# Patient Record
Sex: FEMALE | Race: BLACK OR AFRICAN AMERICAN | Employment: UNEMPLOYED | URBAN - METROPOLITAN AREA
[De-identification: names, ages, dates, MRNs, and addresses within clinical notes are randomized per-mention and may not be internally consistent; named-entity substitution may affect disease eponyms.]

---

## 2019-01-20 ENCOUNTER — APPOINTMENT (OUTPATIENT)
Dept: GENERAL RADIOLOGY | Age: 29
End: 2019-01-20
Attending: EMERGENCY MEDICINE
Payer: MEDICAID

## 2019-01-20 ENCOUNTER — HOSPITAL ENCOUNTER (EMERGENCY)
Age: 29
Discharge: HOME | End: 2019-01-20
Attending: EMERGENCY MEDICINE | Admitting: EMERGENCY MEDICINE
Payer: MEDICAID

## 2019-01-20 VITALS
TEMPERATURE: 98 F | SYSTOLIC BLOOD PRESSURE: 105 MMHG | DIASTOLIC BLOOD PRESSURE: 62 MMHG | WEIGHT: 109 LBS | BODY MASS INDEX: 20.06 KG/M2 | RESPIRATION RATE: 22 BRPM | OXYGEN SATURATION: 100 % | HEIGHT: 62 IN | HEART RATE: 127 BPM

## 2019-01-20 DIAGNOSIS — R53.82 CHRONIC FATIGUE: ICD-10-CM

## 2019-01-20 DIAGNOSIS — R53.83 MALAISE AND FATIGUE: ICD-10-CM

## 2019-01-20 DIAGNOSIS — R53.81 MALAISE AND FATIGUE: ICD-10-CM

## 2019-01-20 DIAGNOSIS — R06.02 SOB (SHORTNESS OF BREATH): ICD-10-CM

## 2019-01-20 DIAGNOSIS — D57.00 SICKLE CELL ANEMIA WITH PAIN (HCC): Primary | ICD-10-CM

## 2019-01-20 LAB
ALBUMIN SERPL-MCNC: 3.8 G/DL (ref 3.5–5)
ALBUMIN/GLOB SERPL: 1.2 {RATIO} (ref 1.1–2.2)
ALP SERPL-CCNC: 144 U/L (ref 45–117)
ALT SERPL-CCNC: 32 U/L (ref 12–78)
AMPHET UR QL SCN: NEGATIVE
ANION GAP SERPL CALC-SCNC: 10 MMOL/L (ref 5–15)
APPEARANCE UR: ABNORMAL
AST SERPL-CCNC: 35 U/L (ref 15–37)
BACTERIA URNS QL MICRO: ABNORMAL /HPF
BARBITURATES UR QL SCN: NEGATIVE
BASOPHILS # BLD: 0 K/UL (ref 0–0.1)
BASOPHILS NFR BLD: 0 % (ref 0–1)
BENZODIAZ UR QL: POSITIVE
BILIRUB SERPL-MCNC: 1.4 MG/DL (ref 0.2–1)
BILIRUB UR QL CFM: NEGATIVE
BUN SERPL-MCNC: 15 MG/DL (ref 6–20)
BUN/CREAT SERPL: 8 (ref 12–20)
CALCIUM SERPL-MCNC: 8.7 MG/DL (ref 8.5–10.1)
CANNABINOIDS UR QL SCN: NEGATIVE
CHLORIDE SERPL-SCNC: 110 MMOL/L (ref 97–108)
CO2 SERPL-SCNC: 18 MMOL/L (ref 21–32)
COCAINE UR QL SCN: NEGATIVE
COLOR UR: ABNORMAL
CREAT SERPL-MCNC: 1.89 MG/DL (ref 0.55–1.02)
DIFFERENTIAL METHOD BLD: ABNORMAL
DRUG SCRN COMMENT,DRGCM: ABNORMAL
EOSINOPHIL # BLD: 0.2 K/UL (ref 0–0.4)
EOSINOPHIL NFR BLD: 2 % (ref 0–7)
EPITH CASTS URNS QL MICRO: ABNORMAL /LPF
ERYTHROCYTE [DISTWIDTH] IN BLOOD BY AUTOMATED COUNT: 16.4 % (ref 11.5–14.5)
FLUAV AG NPH QL IA: NEGATIVE
FLUBV AG NOSE QL IA: NEGATIVE
GLOBULIN SER CALC-MCNC: 3.2 G/DL (ref 2–4)
GLUCOSE SERPL-MCNC: 88 MG/DL (ref 65–100)
GLUCOSE UR STRIP.AUTO-MCNC: NEGATIVE MG/DL
HCG UR QL: NEGATIVE
HCT VFR BLD AUTO: 22.5 % (ref 35–47)
HGB BLD-MCNC: 7.6 G/DL (ref 11.5–16)
HGB UR QL STRIP: ABNORMAL
IMM GRANULOCYTES # BLD AUTO: 0.1 K/UL (ref 0–0.04)
IMM GRANULOCYTES NFR BLD AUTO: 1 % (ref 0–0.5)
KETONES UR QL STRIP.AUTO: NEGATIVE MG/DL
LEUKOCYTE ESTERASE UR QL STRIP.AUTO: ABNORMAL
LYMPHOCYTES # BLD: 2 K/UL (ref 0.8–3.5)
LYMPHOCYTES NFR BLD: 24 % (ref 12–49)
MCH RBC QN AUTO: 28.3 PG (ref 26–34)
MCHC RBC AUTO-ENTMCNC: 33.8 G/DL (ref 30–36.5)
MCV RBC AUTO: 83.6 FL (ref 80–99)
METHADONE UR QL: POSITIVE
MONOCYTES # BLD: 1.2 K/UL (ref 0–1)
MONOCYTES NFR BLD: 14 % (ref 5–13)
NEUTS SEG # BLD: 5.1 K/UL (ref 1.8–8)
NEUTS SEG NFR BLD: 59 % (ref 32–75)
NITRITE UR QL STRIP.AUTO: NEGATIVE
NRBC # BLD: 0.17 K/UL (ref 0–0.01)
NRBC BLD-RTO: 2 PER 100 WBC
OPIATES UR QL: POSITIVE
PCP UR QL: NEGATIVE
PH UR STRIP: 5 [PH] (ref 5–8)
PLATELET # BLD AUTO: 268 K/UL (ref 150–400)
PMV BLD AUTO: 9.7 FL (ref 8.9–12.9)
POTASSIUM SERPL-SCNC: 3.7 MMOL/L (ref 3.5–5.1)
PROT SERPL-MCNC: 7 G/DL (ref 6.4–8.2)
PROT UR STRIP-MCNC: NEGATIVE MG/DL
RBC # BLD AUTO: 2.69 M/UL (ref 3.8–5.2)
RBC #/AREA URNS HPF: ABNORMAL /HPF (ref 0–5)
RETICS # AUTO: 0.14 M/UL (ref 0.02–0.08)
RETICS/RBC NFR AUTO: 5.4 % (ref 0.7–2.1)
SODIUM SERPL-SCNC: 138 MMOL/L (ref 136–145)
SP GR UR REFRACTOMETRY: 1.02 (ref 1–1.03)
UA: UC IF INDICATED,UAUC: ABNORMAL
UROBILINOGEN UR QL STRIP.AUTO: 1 EU/DL (ref 0.2–1)
WBC # BLD AUTO: 8.6 K/UL (ref 3.6–11)
WBC URNS QL MICRO: ABNORMAL /HPF (ref 0–4)

## 2019-01-20 PROCEDURE — 85025 COMPLETE CBC W/AUTO DIFF WBC: CPT

## 2019-01-20 PROCEDURE — 87804 INFLUENZA ASSAY W/OPTIC: CPT

## 2019-01-20 PROCEDURE — 87086 URINE CULTURE/COLONY COUNT: CPT

## 2019-01-20 PROCEDURE — 80307 DRUG TEST PRSMV CHEM ANLYZR: CPT

## 2019-01-20 PROCEDURE — 80053 COMPREHEN METABOLIC PANEL: CPT

## 2019-01-20 PROCEDURE — 81025 URINE PREGNANCY TEST: CPT

## 2019-01-20 PROCEDURE — 71046 X-RAY EXAM CHEST 2 VIEWS: CPT

## 2019-01-20 PROCEDURE — 96374 THER/PROPH/DIAG INJ IV PUSH: CPT

## 2019-01-20 PROCEDURE — 74011250636 HC RX REV CODE- 250/636: Performed by: EMERGENCY MEDICINE

## 2019-01-20 PROCEDURE — 94640 AIRWAY INHALATION TREATMENT: CPT

## 2019-01-20 PROCEDURE — 77030029684 HC NEB SM VOL KT MONA -A

## 2019-01-20 PROCEDURE — 96375 TX/PRO/DX INJ NEW DRUG ADDON: CPT

## 2019-01-20 PROCEDURE — 85045 AUTOMATED RETICULOCYTE COUNT: CPT

## 2019-01-20 PROCEDURE — 99285 EMERGENCY DEPT VISIT HI MDM: CPT

## 2019-01-20 PROCEDURE — 36415 COLL VENOUS BLD VENIPUNCTURE: CPT

## 2019-01-20 PROCEDURE — 81001 URINALYSIS AUTO W/SCOPE: CPT

## 2019-01-20 PROCEDURE — 74011000250 HC RX REV CODE- 250: Performed by: EMERGENCY MEDICINE

## 2019-01-20 PROCEDURE — 96361 HYDRATE IV INFUSION ADD-ON: CPT

## 2019-01-20 RX ORDER — IPRATROPIUM BROMIDE AND ALBUTEROL SULFATE 2.5; .5 MG/3ML; MG/3ML
3 SOLUTION RESPIRATORY (INHALATION)
Status: COMPLETED | OUTPATIENT
Start: 2019-01-20 | End: 2019-01-20

## 2019-01-20 RX ORDER — KETOROLAC TROMETHAMINE 30 MG/ML
15 INJECTION, SOLUTION INTRAMUSCULAR; INTRAVENOUS
Status: COMPLETED | OUTPATIENT
Start: 2019-01-20 | End: 2019-01-20

## 2019-01-20 RX ADMIN — SODIUM CHLORIDE 1000 ML: 900 INJECTION, SOLUTION INTRAVENOUS at 07:37

## 2019-01-20 RX ADMIN — KETOROLAC TROMETHAMINE 15 MG: 30 INJECTION, SOLUTION INTRAMUSCULAR at 04:41

## 2019-01-20 RX ADMIN — SODIUM CHLORIDE 1000 ML: 900 INJECTION, SOLUTION INTRAVENOUS at 04:40

## 2019-01-20 RX ADMIN — IPRATROPIUM BROMIDE AND ALBUTEROL SULFATE 3 ML: .5; 3 SOLUTION RESPIRATORY (INHALATION) at 06:20

## 2019-01-20 RX ADMIN — METHYLPREDNISOLONE SODIUM SUCCINATE 125 MG: 125 INJECTION, POWDER, FOR SOLUTION INTRAMUSCULAR; INTRAVENOUS at 06:20

## 2019-01-20 NOTE — ROUTINE PROCESS
Dr Corina Murphy reviewed discharge instructions with the patient. The patient verbalized understanding. Alert and stable to walk at discharge

## 2019-01-20 NOTE — DISCHARGE INSTRUCTIONS
Patient Education        Sickle Cell Disease: Care Instructions  Your Care Instructions    Sickle cell disease turns normal, round red blood cells into misshaped cells that look like [place] or [place]. The sickle-shaped cells can get stuck in blood vessels, blocking blood flow and causing severe pain. The sickle-shaped cells also can harm organs, muscles, and bones. It is a lifelong condition. Sickle cell disease is passed down in families. You can talk to your doctor about whether to have genetic tests to find out the chance of having a child with the disease. Your doctor also may recommend that your family members get tested for sickle cell disease. Your doctor may treat you with medicines. Some people get blood transfusions or a bone marrow transplant. Managing pain is an important part of your treatment. Follow-up care is a key part of your treatment and safety. Be sure to make and go to all appointments, and call your doctor if you are having problems. It's also a good idea to know your test results and keep a list of the medicines you take. How can you care for yourself at home? · Take your medicines exactly as prescribed. Call your doctor if you think you are having a problem with your medicine. · Take pain medicines exactly as directed. ? If the doctor gave you a prescription medicine for pain, take it as prescribed. ? If you are not taking a prescription pain medicine, ask your doctor if you can take an over-the-counter medicine. · Try to help ease pain by distracting yourself. Use guided imagery, deep breathing, and relaxation exercises. A pain specialist can teach you pain management skills. · Avoid alcohol. It can make you dehydrated. · Dress warmly in cold weather. The cold and windy weather can lead to severe pain. · Do not smoke. Smoking can reduce the amount of oxygen in your blood. If you need help quitting, talk to your doctor about stop-smoking programs and medicines.  These can increase your chances of quitting for good. · Get plenty of sleep. · Get regular eye exams. Sickle cell disease can cause vision problems. · Wear medical alert jewelry that says that you have sickle cell disease. You can buy this at most drugstores. · Avoid colds and flu. Get a pneumococcal vaccine shot. If you have had one before, ask your doctor whether you need another dose. Get a flu shot every year. If you must be around people with colds or flu, wash your hands often. When should you call for help? Call 911 anytime you think you may need emergency care. For example, call if:    · You have symptoms of a severe problem from sickle cell.     · You have symptoms of a stroke. These may include:  ? Sudden numbness, tingling, weakness, or loss of movement in your face, arm, or leg, especially on only one side of your body. ? Sudden vision changes. ? Sudden trouble speaking. ? Sudden confusion or trouble understanding simple statements. ? Sudden problems with walking or balance. ? A sudden, severe headache that is different from past headaches.     · You are in severe pain.     · You have symptoms of a heart attack. These may include:  ? Chest pain or pressure, or a strange feeling in the chest.  ? Sweating. ? Shortness of breath. ? Nausea or vomiting. ? Pain, pressure, or a strange feeling in the back, neck, jaw, or upper belly or in one or both shoulders or arms. ? Lightheadedness or sudden weakness. ? A fast or irregular heartbeat. After you call 911, the  may tell you to chew 1 adult-strength or 2 to 4 low-dose aspirin. Wait for an ambulance. Do not try to drive yourself.    Call your doctor now or seek immediate medical care if:    · You have a fever.    Watch closely for changes in your health, and be sure to contact your doctor if you have any problems. Where can you learn more? Go to http://nory-denny.info/.   Enter 986 [id number] in the search box to learn more about \"Sickle Cell Disease: Care Instructions. \"  Current as of: May 6, 2018  Content Version: 11.9  © 6508-5447 [employer]. Care instructions adapted under license by [employer] (which disclaims liability or warranty for this information). If you have questions about a medical condition or this instruction, always ask your healthcare professional. [address] any warranty or liability for your use of this information. Patient Education        Shortness of Breath: Care Instructions  Your Care Instructions  Shortness of breath has many causes. Sometimes conditions such as anxiety can lead to shortness of breath. Some people get mild shortness of breath when they exercise. Trouble breathing also can be a symptom of a serious problem, such as asthma, lung disease, emphysema, heart problems, and pneumonia. If your shortness of breath continues, you may need tests and treatment. Watch for any changes in your breathing and other symptoms. Follow-up care is a key part of your treatment and safety. Be sure to make and go to all appointments, and call your doctor if you are having problems. It's also a good idea to know your test results and keep a list of the medicines you take. How can you care for yourself at home? · Do not smoke or allow others to smoke around you. If you need help quitting, talk to your doctor about stop-smoking programs and medicines. These can increase your chances of quitting for good. · Get plenty of rest and sleep. · Take your medicines exactly as prescribed. Call your doctor if you think you are having a problem with your medicine. · Find healthy ways to deal with stress. ? Exercise daily. ? Get plenty of sleep. ? Eat regularly and well. When should you call for help? Call 911 anytime you think you may need emergency care. For example, call if:    · You have severe shortness of breath.     · You have symptoms of a heart attack.  These may include:  ? Chest pain or pressure, or a strange feeling in the chest.  ? Sweating. ? Shortness of breath. ? Nausea or vomiting. ? Pain, pressure, or a strange feeling in the back, neck, jaw, or upper belly or in one or both shoulders or arms. ? Lightheadedness or sudden weakness. ? A fast or irregular heartbeat. After you call 911, the  may tell you to chew 1 adult-strength or 2 to 4 low-dose aspirin. Wait for an ambulance. Do not try to drive yourself.    Call your doctor now or seek immediate medical care if:    · Your shortness of breath gets worse or you start to wheeze. Wheezing is a high-pitched sound when you breathe.     · You wake up at night out of breath or have to prop your head up on several pillows to breathe.     · You are short of breath after only light activity or while at rest.    Watch closely for changes in your health, and be sure to contact your doctor if:    · You do not get better over the next 1 to 2 days. Where can you learn more? Go to http://nory-denny.info/. Enter S780 in the search box to learn more about \"Shortness of Breath: Care Instructions. \"  Current as of: September 5, 2018  Content Version: 11.9  © 7373-2662 [employer]. Care instructions adapted under license by [employer] (which disclaims liability or warranty for this information). If you have questions about a medical condition or this instruction, always ask your healthcare professional. [place] [address] any warranty or liability for your use of this information.

## 2019-01-20 NOTE — ED PROVIDER NOTES
EMERGENCY DEPARTMENT HISTORY AND PHYSICAL EXAM  
     
 
Date: 1/20/2019 Patient Name: Zack Mo History of Presenting Illness Chief Complaint Patient presents with  Shortness of Breath Pt reports SOB w/ productive cough for three days. Pt has had a sore throat for two weeks. Pt says she has fatigue, weakness and lack of an appetite. transported from home by EMS. History Provided By: Patient HPI: Zack Mo is a 34 y.o. female, pmhx lupus, seizures, sickle cell, asthma, HTN, cholecystectomy, splenectomy, who presents via EMS to the ED c/o gradual onset SOB, with associated sxs of sore throat, cough, fatigue, and weakness, onset ~2 weeks ago. Pt reports sxs are similar to her past pneumonia. Pt describes generalized weakness and fatigue as \"sickle cell body-aches. \"  Pt reports productive cough of clear phlegm. Pt reports ~2 weeks ago, she had BLE edema, which is currently relieved. Pt reports at onset of \"pneumonia like sxs,\" she started to take prescription medications of Oxycodone and Methadone every ~30 minutes to an hour, and reports becoming very somnolent during that time, and reports being afraid she was going to overdose on medications. Pt reports on 01/01/2019, pt had seizure, and reports wheezing at onset of episode, but states vitals, including SpO2, was at baseline. Pt reports taking Keppra for seizures. Pt denies smoking cigarettes or illicit drug use, but reports drinking alcohol on occasion, last time being 12/25/2018. Pt denies getting most recent strain of flu shot injection. Pt specifically denies any recent fevers, chills, nausea, vomiting, diarrhea, abd pain, CP, urinary sxs, changes in BM, or headache. PCP: None Allergies: morphine, hydroxyurea, PMHx: Significant for lupus, seizures, sickle cell, asthma, HTN 
PSHx: Significant for cholecystectomy, splenectomy Social Hx: EtOH (beer, wine, liquor) There are no other complaints, changes, or physical findings at this time. Current Facility-Administered Medications Medication Dose Route Frequency Provider Last Rate Last Dose  sodium chloride 0.9 % bolus infusion 1,000 mL  1,000 mL IntraVENous NOW Jamari Mathis MD 1,000 mL/hr at 01/20/19 0737 1,000 mL at 01/20/19 [phone] Current Outpatient Medications Medication Sig Dispense Refill  ondansetron hcl (ZOFRAN, AS HYDROCHLORIDE,) 4 mg tablet Take 1 Tab by mouth every eight (8) hours as needed for Nausea. 20 Tab 0  
 HYDROcodone-acetaminophen (NORCO) 7.5-325 mg per tablet Take 1 Tab by mouth every six (6) hours as needed for Pain. Max Daily Amount: 4 Tabs. 20 Tab 0  
 HYDROcodone-acetaminophen (NORCO) 5-325 mg per tablet Take 1 Tab by mouth every four (4) hours as needed for Pain. Max Daily Amount: 6 Tabs. 20 Tab 0  ALPRAZolam (XANAX) 1 mg tablet Take  by mouth.  Biotin 2,500 mcg cap Take  by mouth.  docusate sodium (COLACE) 100 mg capsule Take 100 mg by mouth two (2) times a day.  gabapentin (NEURONTIN) 300 mg capsule Take 300 mg by mouth three (3) times daily.  medroxyPROGESTERone (DEPO-PROVERA) 150 mg/mL injection 150 mg by IntraMUSCular route once.  omega-3 fatty acids-vitamin e 1,000 mg cap Take 1 Cap by mouth.  vitamin E (AQUA GEMS) 400 unit capsule Take  by mouth daily.  methadone (DOLOPHINE) 10 mg tablet Take  by mouth every four (4) hours as needed for Pain.  ibuprofen (MOTRIN) 800 mg tablet Take 800 mg by mouth.  cyclobenzaprine (FLEXERIL) 10 mg tablet Take  by mouth three (3) times daily as needed for Muscle Spasm(s).  albuterol (PROVENTIL VENTOLIN) 2.5 mg /3 mL (0.083 %) nebulizer solution 3 mL by Nebulization route every four (4) hours as needed. 1 Package 0  
 calcium-vitamin D (OSCAL 250) 250-125 mg-unit tablet Take 1 Tab by mouth two (2) times a day.  60 Tab 0  
  citalopram (CELEXA) 20 mg tablet Take 1 Tab by mouth daily. 30 Tab 0  
 fluticasone (FLONASE) 50 mcg/actuation nasal spray 2 sprays each nostril daily 1 Bottle 5  
 folic acid (FOLVITE) 1 mg tablet Take 1 Tab by mouth daily. 30 Tab 0  
 hydroxychloroquine (PLAQUENIL) 200 mg tablet Take 2 Tabs by mouth daily. 30 Tab 0  
 levETIRAcetam (KEPPRA) 500 mg tablet Take 1 Tab by mouth two (2) times a day. 60 Tab 0  
 mycophenolate (CELLCEPT) 500 mg tablet Take 2 Tabs by mouth daily. 30 Tab 0  
 omeprazole (PRILOSEC) 40 mg capsule Take 1 Cap by mouth daily. Indications: GASTROESOPHAGEAL REFLUX 30 Cap 0  
 oxyCODONE CR (OXYCONTIN) 60 mg CR tablet Take 2 Tabs by mouth every twelve (12) hours. Max Daily Amount: 240 mg. 120 Tab 0  predniSONE (DELTASONE) 5 mg tablet Take 3 Tabs by mouth daily. 90 Tab 0  
 oxyCODONE IR (OXY-IR) 30 mg immediate release tablet Take 1 Tab by mouth every three (3) hours. 1-3 tab 30 Tab 0  
 HYDROmorphone (DILAUDID) 8 mg tablet Take 1 Tab by mouth every two (2) hours as needed. Indications: PAIN 30 Tab 0  
 butalbital-acetaminophen-caffeine (FIORICET) -40 mg per tablet Take 2 Tabs by mouth every four (4) hours as needed.  amLODIPine (NORVASC) 10 mg tablet Take 10 mg by mouth daily.  citalopram (CELEXA) 10 mg tablet Take 10 mg by mouth daily.  clonazePAM (KLONOPIN) 0.5 mg tablet Take 0.5 mg by mouth two (2) times a day.  docusate sodium (COLACE) 100 mg capsule Take 100 mg by mouth two (2) times a day.  folic acid (FOLVITE) 1 mg tablet Take  by mouth daily.  hydrALAZINE (APRESOLINE) 25 mg tablet Take 75 mg by mouth three (3) times daily.  hydroxychloroquine (PLAQUINIL) 200 mg tablet Take 400 mg by mouth daily.  levETIRAcetam (KEPPRA) 500 mg tablet Take 500 mg by mouth two (2) times a day.  losartan (COZAAR) 100 mg tablet Take 100 mg by mouth daily.  mycophenolate (CELLCEPT) 500 mg tablet Take 1,000 mg by mouth daily.  omeprazole (PRILOSEC) 40 mg capsule Take 40 mg by mouth daily.  predniSONE (DELTASONE) 5 mg tablet Take 15 mg by mouth daily.  zolpidem (AMBIEN) 5 mg tablet Take 5 mg by mouth nightly as needed. Indications: SLEEP-ONSET INSOMNIA, agitation  albuterol (PROVENTIL VENTOLIN) 2.5 mg /3 mL (0.083 %) nebulizer solution 5 mg by Nebulization route two (2) times a day. Indications: wheezing Past History Past Medical History: 
Past Medical History:  
Diagnosis Date  Anemia NEC sickel cell  Asthma  Autoimmune disease ([facility] lupus  Epilepsy ([facility]  Hypertension  Lupus  Other ill-defined conditions(799.89)   
 avascular necrosis  Other ill-defined conditions(799.89) Sickle Cell  Psychiatric disorder   
 anxiety, depression, and bipolar  Seizures ([facility]  Sickle cell disease ([facility] Past Surgical History: 
Past Surgical History:  
Procedure Laterality Date  HX CHOLECYSTECTOMY  HX HEENT    
 T&A Aurelio Brunette HIP REPLACEMENT  April of 2013  HX ORTHOPAEDIC    
 right hip replacement  HX OTHER SURGICAL  splenectomy  
 tonsillectomy  HX OTHER SURGICAL    
 spleen removed [facility] [facility] Family History: 
Family History Problem Relation Age of Onset  Hypertension Mother  Asthma Mother  Asthma Brother Social History: 
Social History Tobacco Use  Smoking status: Never Smoker  Smokeless tobacco: Never Used Substance Use Topics  Alcohol use: Yes Comment: Occassionally  Drug use: No  
 
 
Allergies: Allergies Allergen Reactions  Hydroxyurea Seizures  Morphine Seizures  Morphine Shortness of Breath and Itching C/o dizziness Review of Systems Review of Systems Constitutional: Positive for fatigue. Negative for fever. HENT: Positive for sore throat. Eyes: Negative. Respiratory: Positive for cough and shortness of breath. Cardiovascular: Negative for chest pain. Gastrointestinal: Negative for abdominal pain, nausea and vomiting. Endocrine: Negative. Genitourinary: Negative. Negative for difficulty urinating, dysuria and hematuria. Musculoskeletal: Negative. Skin: Negative. Allergic/Immunologic: Positive for immunocompromised state. Neurological: Positive for weakness. Psychiatric/Behavioral: Negative for suicidal ideas. All other systems reviewed and are negative. Physical Exam  
Physical Exam  
Constitutional: She is oriented to person, place, and time. She appears well-developed and well-nourished. No distress. Thin, frail HENT:  
Head: Normocephalic and atraumatic. Nose: Nose normal.  
Eyes: Conjunctivae and EOM are normal. No scleral icterus. Neck: Normal range of motion. No tracheal deviation present. Cardiovascular: Normal rate, regular rhythm, normal heart sounds and intact distal pulses. Exam reveals no friction rub. No murmur heard. Pulmonary/Chest: Effort normal and breath sounds normal. No stridor. No respiratory distress. She has no wheezes. She has no rales. Pt able to speak in full, unlabored sentences Abdominal: Soft. Bowel sounds are normal. She exhibits no distension. There is no tenderness. There is no rebound. Musculoskeletal: Normal range of motion. She exhibits no tenderness. Neurological: She is alert and oriented to person, place, and time. No cranial nerve deficit. She exhibits normal muscle tone. Coordination normal.  
Skin: Skin is warm and dry. No rash noted. She is not diaphoretic. Psychiatric: She has a normal mood and affect. Her speech is normal and behavior is normal. Judgment and thought content normal. Cognition and memory are normal.  
Nursing note and vitals reviewed. Diagnostic Study Results Labs - Recent Results (from the past 12 hour(s)) HCG URINE, QL. - POC  
 Collection Time: 01/20/19  4:13 AM  
Result Value Ref Range Pregnancy test,urine (POC) NEGATIVE  NEG    
CBC WITH AUTOMATED DIFF Collection Time: 01/20/19  4:18 AM  
Result Value Ref Range WBC 8.6 3.6 - 11.0 K/uL  
 RBC 2.69 (L) 3.80 - 5.20 M/uL HGB 7.6 (L) 11.5 - 16.0 g/dL HCT 22.5 (L) 35.0 - 47.0 % MCV 83.6 80.0 - 99.0 FL  
 MCH 28.3 26.0 - 34.0 PG  
 MCHC 33.8 30.0 - 36.5 g/dL  
 RDW 16.4 (H) 11.5 - 14.5 % PLATELET 145 051 - 540 K/uL MPV 9.7 8.9 - 12.9 FL  
 NRBC 2.0 (H) 0  WBC ABSOLUTE NRBC 0.17 (H) 0.00 - 0.01 K/uL NEUTROPHILS 59 32 - 75 % LYMPHOCYTES 24 12 - 49 % MONOCYTES 14 (H) 5 - 13 % EOSINOPHILS 2 0 - 7 % BASOPHILS 0 0 - 1 % IMMATURE GRANULOCYTES 1 (H) 0.0 - 0.5 % ABS. NEUTROPHILS 5.1 1.8 - 8.0 K/UL  
 ABS. LYMPHOCYTES 2.0 0.8 - 3.5 K/UL  
 ABS. MONOCYTES 1.2 (H) 0.0 - 1.0 K/UL  
 ABS. EOSINOPHILS 0.2 0.0 - 0.4 K/UL  
 ABS. BASOPHILS 0.0 0.0 - 0.1 K/UL  
 ABS. IMM. GRANS. 0.1 (H) 0.00 - 0.04 K/UL  
 DF AUTOMATED METABOLIC PANEL, COMPREHENSIVE Collection Time: 01/20/19  4:18 AM  
Result Value Ref Range Sodium 138 136 - 145 mmol/L Potassium 3.7 3.5 - 5.1 mmol/L Chloride 110 (H) 97 - 108 mmol/L  
 CO2 18 (L) 21 - 32 mmol/L Anion gap 10 5 - 15 mmol/L Glucose 88 65 - 100 mg/dL BUN 15 6 - 20 MG/DL Creatinine 1.89 (H) 0.55 - 1.02 MG/DL  
 BUN/Creatinine ratio 8 (L) 12 - 20 GFR est AA 39 (L) >60 ml/min/1.73m2 GFR est non-AA 32 (L) >60 ml/min/1.73m2 Calcium 8.7 8.5 - 10.1 MG/DL Bilirubin, total 1.4 (H) 0.2 - 1.0 MG/DL  
 ALT (SGPT) 32 12 - 78 U/L  
 AST (SGOT) 35 15 - 37 U/L Alk. phosphatase 144 (H) 45 - 117 U/L Protein, total 7.0 6.4 - 8.2 g/dL Albumin 3.8 3.5 - 5.0 g/dL Globulin 3.2 2.0 - 4.0 g/dL A-G Ratio 1.2 1.1 - 2.2 URINALYSIS W/ REFLEX CULTURE Collection Time: 01/20/19  4:18 AM  
Result Value Ref Range Color DARK YELLOW  Appearance CLOUDY (A) CLEAR    
 Specific gravity 1.022 1.003 - 1.030    
 pH (UA) 5.0 5.0 - 8.0 Protein NEGATIVE  NEG mg/dL Glucose NEGATIVE  NEG mg/dL Ketone NEGATIVE  NEG mg/dL Blood LARGE (A) NEG Urobilinogen 1.0 0.2 - 1.0 EU/dL Nitrites NEGATIVE  NEG Leukocyte Esterase SMALL (A) NEG    
 WBC 5-10 0 - 4 /hpf  
 RBC 20-50 0 - 5 /hpf Epithelial cells MODERATE (A) FEW /lpf Bacteria 1+ (A) NEG /hpf  
 UA:UC IF INDICATED URINE CULTURE ORDERED (A) CNI    
DRUG SCREEN, URINE Collection Time: 01/20/19  4:18 AM  
Result Value Ref Range AMPHETAMINES NEGATIVE  NEG    
 BARBITURATES NEGATIVE  NEG BENZODIAZEPINES POSITIVE (A) NEG    
 COCAINE NEGATIVE  NEG METHADONE POSITIVE (A) NEG    
 OPIATES POSITIVE (A) NEG    
 PCP(PHENCYCLIDINE) NEGATIVE  NEG    
 THC (TH-CANNABINOL) NEGATIVE  NEG Drug screen comment (NOTE) RETICULOCYTE COUNT Collection Time: 01/20/19  4:18 AM  
Result Value Ref Range Reticulocyte count 5.4 (H) 0.7 - 2.1 % Absolute Retic Cnt. 0.1450 (H) 0.0164 - 0.0776 M/ul BILIRUBIN, CONFIRM Collection Time: 01/20/19  4:18 AM  
Result Value Ref Range Bilirubin UA, confirm NEGATIVE  NEG    
INFLUENZA A & B AG (RAPID TEST) Collection Time: 01/20/19  6:30 AM  
Result Value Ref Range Influenza A Antigen NEGATIVE  NEG Influenza B Antigen NEGATIVE  NEG Radiologic Studies -  
XR CHEST PA LAT Final Result IMPRESSION:  
  
Subtle lingular opacity. No pneumothorax. CT Results  (Last 48 hours) None CXR Results  (Last 48 hours) 01/20/19 0433  XR CHEST PA LAT Final result Impression:  IMPRESSION:  
   
Subtle lingular opacity. No pneumothorax. Narrative:  EXAM: XR CHEST PA LAT INDICATION: Shortness of breath, chest pain, sickle cell anemia. COMPARISON: None TECHNIQUE: PA and lateral chest views FINDINGS: [5 wires The cardiomediastinal and hilar contours are within normal  
 limits. The pulmonary vasculature is within normal limits. Subtle left upper lobe lingular opacity. Right lung is clear. Pleural spaces are  
clear. Surgical clips indicate cholecystectomy. Medical Decision Making I am the first provider for this patient. I reviewed the vital signs, available nursing notes, past medical history, past surgical history, family history and social history. Vital Signs-Reviewed the patient's vital signs. Patient Vitals for the past 12 hrs: 
 Temp Pulse Resp BP SpO2  
01/20/19 0700  (!) 141 18 114/67 100 % 01/20/19 0626  (!) 102 14  100 % 01/20/19 0600  86 17 95/63 100 % 01/20/19 0530  92 13 96/56 100 % 01/20/19 0500  88 15 (!) 87/55 100 % 01/20/19 0400  (!) 101 14  100 % 01/20/19 0330  (!) 109 18 102/79 100 % 01/20/19 0329 98 °F (36.7 °C) (!) 110 14 118/81 100 % Pulse Oximetry Analysis - 100% on RA Cardiac Monitor:  
Rate: 110 bpm 
Rhythm: Sinus Tachycardia 110 Records Reviewed: Nursing Notes, Old Medical Records and Ambulance Run Sheet Provider Notes (Medical Decision Making): DDX: 
Sickle cell pain crisis, pna, flu, electrolyte abnormality Plan: 
Labs, cxr, flu swab, ivf, analgesic Impression: 
Malaise, uri, sob, sickle cell disease ED Course:  
Initial assessment performed. The patients presenting problems have been discussed, and they are in agreement with the care plan formulated and outlined with them. I have encouraged them to ask questions as they arise throughout their visit. I reviewed our electronic medical record system for any past medical records that were available that may contribute to the patients current condition, the nursing notes and and vital signs from today's visit Nursing notes will be reviewed as they become available in realtime while the pt has been in the ED. Luz Hdez MD 
 
I personally reviewed pt's imaging. Official read by radiology noted above. Luz Hdez MD 
 
PROGRESS NOTE: 
0630 AM 
Pt noted to be feeling better. Tachycardic after neb tx but feeling better. Will provide ivf and dc home Luz Hdez MD 
 
 
8:24 AM 
Progress note: 
Pt noted to be feeling better, ready for discharge. Discussed lab and imaging findings with pt, specifically noting hbg and negative cxr/flu testin. Pt will follow up as instructed. All questions have been answered, pt voiced understanding and agreement with plan. If narcotics were prescribed, pt's  record was reviewed and pt was advised not to drive or operate heavy machinery. If abx were prescribed, pt advised that diarrhea and rash are possible side effects of the medications. Specific return precautions provided in addition to instructions for pt to return to the ED immediately should sx worsen at any time. Luz Hdez MD 
 
 
Critical Care Time:  
 
none Diagnosis Clinical Impression: 1. Sickle cell anemia with pain (HCC) 2. SOB (shortness of breath) 3. Chronic fatigue 4. Malaise and fatigue PLAN: 
1. Current Discharge Medication List  
  
 
2. Follow-up Information Follow up With Specialties Details Why Contact Info [facility] EMERGENCY DEPT Emergency Medicine  As needed [address] [place] [address] [address] [facility] 
[phone] Nolan Cote MD Internal Medicine Schedule an appointment as soon as possible for a visit in 2 days  [facility] 4th Floor [place] [id number] [phone] Your   Call in 1 day Return to ED if worse Disposition: 
 
8:24 AM  
The patient's results have been reviewed with family and/or caregiver.  They verbally convey their understanding and agreement of the patient's signs, symptoms, diagnosis, treatment and prognosis and additionally agree to follow up as recommended in the discharge instructions or to return to the Emergency Room should the patient's condition change prior to their follow-up appointment. The family and/or caregiver verbally agrees with the care-plan and all of their questions have been answered. The discharge instructions have also been provided to the them with educational information regarding the patient's diagnosis as well a list of reasons why the patient would want to return to the ER prior to their follow-up appointment should their condition change. Jimmie Ballard MD 
 
 
 
 
 
 
 
 
 
Attestations: This note is prepared by Javier Foreman. Jahaira, acting as Scribe for MD Jimmie Moore MD : The scribe's documentation has been prepared under my direction and personally reviewed by me in its entirety. I confirm that the note above accurately reflects all work, treatment, procedures, and medical decision making performed by me. This note will not be viewable in [address]

## 2019-01-20 NOTE — ED NOTES
Bedside and Verbal shift change report given to Althea (oncoming nurse) by Nikkie Demond (offgoing nurse). Report included the following information ED Summary and MAR. Case discussed with MD; will give 1 L NS. Pt resting, reports leg pain 6/10. Pillow provided for comfort.
IVF started as ordered.
no

## 2019-01-21 LAB
BACTERIA SPEC CULT: NORMAL
CC UR VC: NORMAL
SERVICE CMNT-IMP: NORMAL

## 2019-03-18 ENCOUNTER — APPOINTMENT (OUTPATIENT)
Dept: CT IMAGING | Age: 29
End: 2019-03-18
Attending: EMERGENCY MEDICINE
Payer: MEDICAID

## 2019-03-18 ENCOUNTER — HOSPITAL ENCOUNTER (EMERGENCY)
Age: 29
Discharge: ANOTHER HOSPITAL | End: 2019-03-18
Attending: EMERGENCY MEDICINE
Payer: MEDICAID

## 2019-03-18 ENCOUNTER — APPOINTMENT (OUTPATIENT)
Dept: GENERAL RADIOLOGY | Age: 29
End: 2019-03-18
Attending: EMERGENCY MEDICINE
Payer: MEDICAID

## 2019-03-18 VITALS
TEMPERATURE: 95.9 F | WEIGHT: 115 LBS | SYSTOLIC BLOOD PRESSURE: 128 MMHG | HEART RATE: 139 BPM | BODY MASS INDEX: 19.16 KG/M2 | OXYGEN SATURATION: 100 % | HEIGHT: 65 IN | RESPIRATION RATE: 16 BRPM | DIASTOLIC BLOOD PRESSURE: 101 MMHG

## 2019-03-18 DIAGNOSIS — I62.00 SUBDURAL HEMORRHAGE (HCC): Primary | ICD-10-CM

## 2019-03-18 DIAGNOSIS — R56.9 SEIZURE (HCC): ICD-10-CM

## 2019-03-18 DIAGNOSIS — J96.02 ACUTE RESPIRATORY FAILURE WITH HYPERCAPNIA (HCC): ICD-10-CM

## 2019-03-18 DIAGNOSIS — E13.11 DIABETIC KETOACIDOSIS WITH COMA ASSOCIATED WITH OTHER SPECIFIED DIABETES MELLITUS (HCC): ICD-10-CM

## 2019-03-18 DIAGNOSIS — D57.00 HB-SS DISEASE WITH CRISIS (HCC): ICD-10-CM

## 2019-03-18 DIAGNOSIS — D64.9 ANEMIA, UNSPECIFIED TYPE: ICD-10-CM

## 2019-03-18 LAB
ABO + RH BLD: NORMAL
ALBUMIN SERPL-MCNC: 3.1 G/DL (ref 3.5–5)
ALBUMIN/GLOB SERPL: 0.9 {RATIO} (ref 1.1–2.2)
ALP SERPL-CCNC: 129 U/L (ref 45–117)
ALT SERPL-CCNC: 35 U/L (ref 12–78)
AMPHET UR QL SCN: NEGATIVE
ANION GAP SERPL CALC-SCNC: 27 MMOL/L (ref 5–15)
APAP SERPL-MCNC: 5 UG/ML (ref 10–30)
APPEARANCE UR: CLEAR
ARTERIAL PATENCY WRIST A: YES
ARTERIAL PATENCY WRIST A: YES
AST SERPL-CCNC: 39 U/L (ref 15–37)
ATRIAL RATE: 125 BPM
BACTERIA URNS QL MICRO: ABNORMAL /HPF
BARBITURATES UR QL SCN: NEGATIVE
BASE DEFICIT BLD-SCNC: 17 MMOL/L
BASE DEFICIT BLD-SCNC: 26 MMOL/L
BASOPHILS # BLD: 0 K/UL (ref 0–0.1)
BASOPHILS NFR BLD: 0 % (ref 0–1)
BDY SITE: ABNORMAL
BDY SITE: ABNORMAL
BENZODIAZ UR QL: NEGATIVE
BILIRUB SERPL-MCNC: 0.4 MG/DL (ref 0.2–1)
BILIRUB UR QL: NEGATIVE
BLOOD GROUP ANTIBODIES SERPL: NORMAL
BUN SERPL-MCNC: 9 MG/DL (ref 6–20)
BUN/CREAT SERPL: 6 (ref 12–20)
CALCIUM SERPL-MCNC: 9 MG/DL (ref 8.5–10.1)
CALCULATED P AXIS, ECG09: 67 DEGREES
CALCULATED R AXIS, ECG10: 42 DEGREES
CALCULATED T AXIS, ECG11: 85 DEGREES
CANNABINOIDS UR QL SCN: NEGATIVE
CHLORIDE SERPL-SCNC: 107 MMOL/L (ref 97–108)
CO2 SERPL-SCNC: 5 MMOL/L (ref 21–32)
COCAINE UR QL SCN: NEGATIVE
COLOR UR: ABNORMAL
CREAT SERPL-MCNC: 1.61 MG/DL (ref 0.55–1.02)
DIAGNOSIS, 93000: NORMAL
DIFFERENTIAL METHOD BLD: ABNORMAL
DRUG SCRN COMMENT,DRGCM: ABNORMAL
EOSINOPHIL # BLD: 0 K/UL (ref 0–0.4)
EOSINOPHIL NFR BLD: 0 % (ref 0–7)
EPITH CASTS URNS QL MICRO: ABNORMAL /LPF
ERYTHROCYTE [DISTWIDTH] IN BLOOD BY AUTOMATED COUNT: 18.7 % (ref 11.5–14.5)
ETHANOL SERPL-MCNC: <10 MG/DL
GAS FLOW.O2 O2 DELIVERY SYS: ABNORMAL L/MIN
GAS FLOW.O2 O2 DELIVERY SYS: ABNORMAL L/MIN
GAS FLOW.O2 SETTING OXYMISER: 2 L/M
GAS FLOW.O2 SETTING OXYMISER: 20 BPM
GLOBULIN SER CALC-MCNC: 3.4 G/DL (ref 2–4)
GLUCOSE BLD STRIP.AUTO-MCNC: 326 MG/DL (ref 65–100)
GLUCOSE SERPL-MCNC: 421 MG/DL (ref 65–100)
GLUCOSE UR STRIP.AUTO-MCNC: >1000 MG/DL
HCO3 BLD-SCNC: 11 MMOL/L (ref 22–26)
HCO3 BLD-SCNC: 8.6 MMOL/L (ref 22–26)
HCT VFR BLD AUTO: 22.6 % (ref 35–47)
HGB BLD-MCNC: 6.5 G/DL (ref 11.5–16)
HGB UR QL STRIP: ABNORMAL
IMM GRANULOCYTES # BLD AUTO: 0 K/UL (ref 0–0.04)
IMM GRANULOCYTES NFR BLD AUTO: 0 % (ref 0–0.5)
KETONES UR QL STRIP.AUTO: NEGATIVE MG/DL
LACTATE SERPL-SCNC: 22.1 MMOL/L (ref 0.4–2)
LEUKOCYTE ESTERASE UR QL STRIP.AUTO: NEGATIVE
LYMPHOCYTES # BLD: 17.7 K/UL (ref 0.8–3.5)
LYMPHOCYTES NFR BLD: 53 % (ref 12–49)
MCH RBC QN AUTO: 27.5 PG (ref 26–34)
MCHC RBC AUTO-ENTMCNC: 28.8 G/DL (ref 30–36.5)
MCV RBC AUTO: 95.8 FL (ref 80–99)
METAMYELOCYTES NFR BLD MANUAL: 1 %
METHADONE UR QL: POSITIVE
MONOCYTES # BLD: 1 K/UL (ref 0–1)
MONOCYTES NFR BLD: 3 % (ref 5–13)
MYELOCYTES NFR BLD MANUAL: 2 %
NEUTS BAND NFR BLD MANUAL: 2 %
NEUTS SEG # BLD: 13.7 K/UL (ref 1.8–8)
NEUTS SEG NFR BLD: 39 % (ref 32–75)
NITRITE UR QL STRIP.AUTO: NEGATIVE
NRBC # BLD: 0.13 K/UL (ref 0–0.01)
NRBC BLD-RTO: 0.4 PER 100 WBC
O2/TOTAL GAS SETTING VFR VENT: 50 %
OPIATES UR QL: NEGATIVE
P-R INTERVAL, ECG05: 184 MS
PATH REV BLD -IMP: NORMAL
PCO2 BLD: 28 MMHG (ref 35–45)
PCO2 BLD: 53.8 MMHG (ref 35–45)
PCP UR QL: NEGATIVE
PEEP RESPIRATORY: 6 CMH2O
PH BLD: 6.81 [PH] (ref 7.35–7.45)
PH BLD: 7.2 [PH] (ref 7.35–7.45)
PH UR STRIP: 6.5 [PH] (ref 5–8)
PLATELET # BLD AUTO: 286 K/UL (ref 150–400)
PMV BLD AUTO: 10.3 FL (ref 8.9–12.9)
PO2 BLD: 160 MMHG (ref 80–100)
PO2 BLD: 276 MMHG (ref 80–100)
POTASSIUM SERPL-SCNC: 5.1 MMOL/L (ref 3.5–5.1)
PROT SERPL-MCNC: 6.5 G/DL (ref 6.4–8.2)
PROT UR STRIP-MCNC: 30 MG/DL
Q-T INTERVAL, ECG07: 296 MS
QRS DURATION, ECG06: 78 MS
QTC CALCULATION (BEZET), ECG08: 427 MS
RBC # BLD AUTO: 2.36 M/UL (ref 3.8–5.2)
RBC #/AREA URNS HPF: ABNORMAL /HPF (ref 0–5)
RBC MORPH BLD: ABNORMAL
SALICYLATES SERPL-MCNC: 6.5 MG/DL (ref 2.8–20)
SAO2 % BLD: 100 % (ref 92–97)
SAO2 % BLD: 97 % (ref 92–97)
SERVICE CMNT-IMP: ABNORMAL
SODIUM SERPL-SCNC: 139 MMOL/L (ref 136–145)
SP GR UR REFRACTOMETRY: 1.01 (ref 1–1.03)
SPECIMEN EXP DATE BLD: NORMAL
SPECIMEN TYPE: ABNORMAL
SPECIMEN TYPE: ABNORMAL
TOTAL RESP. RATE, ITRR: 20
TOTAL RESP. RATE, ITRR: 24
UA: UC IF INDICATED,UAUC: ABNORMAL
UROBILINOGEN UR QL STRIP.AUTO: 0.2 EU/DL (ref 0.2–1)
VENTILATION MODE VENT: ABNORMAL
VENTRICULAR RATE, ECG03: 125 BPM
VT SETTING VENT: 0 ML
WBC # BLD AUTO: 33.3 K/UL (ref 3.6–11)
WBC MORPH BLD: ABNORMAL
WBC URNS QL MICRO: ABNORMAL /HPF (ref 0–4)

## 2019-03-18 PROCEDURE — 92950 HEART/LUNG RESUSCITATION CPR: CPT

## 2019-03-18 PROCEDURE — 77030008683 HC TU ET CUF COVD -A

## 2019-03-18 PROCEDURE — 31500 INSERT EMERGENCY AIRWAY: CPT

## 2019-03-18 PROCEDURE — 80053 COMPREHEN METABOLIC PANEL: CPT

## 2019-03-18 PROCEDURE — 96375 TX/PRO/DX INJ NEW DRUG ADDON: CPT

## 2019-03-18 PROCEDURE — 36600 WITHDRAWAL OF ARTERIAL BLOOD: CPT

## 2019-03-18 PROCEDURE — 74011000250 HC RX REV CODE- 250: Performed by: EMERGENCY MEDICINE

## 2019-03-18 PROCEDURE — 93005 ELECTROCARDIOGRAM TRACING: CPT

## 2019-03-18 PROCEDURE — 99285 EMERGENCY DEPT VISIT HI MDM: CPT

## 2019-03-18 PROCEDURE — 81001 URINALYSIS AUTO W/SCOPE: CPT

## 2019-03-18 PROCEDURE — 87086 URINE CULTURE/COLONY COUNT: CPT

## 2019-03-18 PROCEDURE — 96374 THER/PROPH/DIAG INJ IV PUSH: CPT

## 2019-03-18 PROCEDURE — 36556 INSERT NON-TUNNEL CV CATH: CPT

## 2019-03-18 PROCEDURE — 83605 ASSAY OF LACTIC ACID: CPT

## 2019-03-18 PROCEDURE — 82803 BLOOD GASES ANY COMBINATION: CPT

## 2019-03-18 PROCEDURE — C1751 CATH, INF, PER/CENT/MIDLINE: HCPCS

## 2019-03-18 PROCEDURE — 51702 INSERT TEMP BLADDER CATH: CPT

## 2019-03-18 PROCEDURE — 75810000137 HC PLCMT CENT VENOUS CATH

## 2019-03-18 PROCEDURE — 96365 THER/PROPH/DIAG IV INF INIT: CPT

## 2019-03-18 PROCEDURE — 74011636637 HC RX REV CODE- 636/637: Performed by: EMERGENCY MEDICINE

## 2019-03-18 PROCEDURE — 96368 THER/DIAG CONCURRENT INF: CPT

## 2019-03-18 PROCEDURE — 36415 COLL VENOUS BLD VENIPUNCTURE: CPT

## 2019-03-18 PROCEDURE — 70450 CT HEAD/BRAIN W/O DYE: CPT

## 2019-03-18 PROCEDURE — 71045 X-RAY EXAM CHEST 1 VIEW: CPT

## 2019-03-18 PROCEDURE — 96361 HYDRATE IV INFUSION ADD-ON: CPT

## 2019-03-18 PROCEDURE — 82962 GLUCOSE BLOOD TEST: CPT

## 2019-03-18 PROCEDURE — 94002 VENT MGMT INPAT INIT DAY: CPT

## 2019-03-18 PROCEDURE — 74011250636 HC RX REV CODE- 250/636: Performed by: EMERGENCY MEDICINE

## 2019-03-18 PROCEDURE — 74011000258 HC RX REV CODE- 258: Performed by: EMERGENCY MEDICINE

## 2019-03-18 PROCEDURE — 77030005534 HC CATH URETH FOL TEMP SENS SIMS -B

## 2019-03-18 PROCEDURE — 86900 BLOOD TYPING SEROLOGIC ABO: CPT

## 2019-03-18 PROCEDURE — 85025 COMPLETE CBC W/AUTO DIFF WBC: CPT

## 2019-03-18 PROCEDURE — 80307 DRUG TEST PRSMV CHEM ANLYZR: CPT

## 2019-03-18 RX ORDER — ETOMIDATE 2 MG/ML
INJECTION INTRAVENOUS
Status: COMPLETED | OUTPATIENT
Start: 2019-03-18 | End: 2019-03-18

## 2019-03-18 RX ORDER — SODIUM CHLORIDE 9 MG/ML
250 INJECTION, SOLUTION INTRAVENOUS AS NEEDED
Status: DISCONTINUED | OUTPATIENT
Start: 2019-03-18 | End: 2019-03-18 | Stop reason: HOSPADM

## 2019-03-18 RX ORDER — DEXTROSE 50 % IN WATER (D50W) INTRAVENOUS SYRINGE
25-50 AS NEEDED
Status: DISCONTINUED | OUTPATIENT
Start: 2019-03-18 | End: 2019-03-18 | Stop reason: HOSPADM

## 2019-03-18 RX ORDER — ROCURONIUM BROMIDE 10 MG/ML
INJECTION, SOLUTION INTRAVENOUS
Status: COMPLETED | OUTPATIENT
Start: 2019-03-18 | End: 2019-03-18

## 2019-03-18 RX ORDER — LORAZEPAM 2 MG/ML
1 INJECTION INTRAMUSCULAR
Status: DISCONTINUED | OUTPATIENT
Start: 2019-03-18 | End: 2019-03-18 | Stop reason: HOSPADM

## 2019-03-18 RX ORDER — MAGNESIUM SULFATE 100 %
4 CRYSTALS MISCELLANEOUS AS NEEDED
Status: DISCONTINUED | OUTPATIENT
Start: 2019-03-18 | End: 2019-03-18 | Stop reason: HOSPADM

## 2019-03-18 RX ORDER — PROPOFOL 10 MG/ML
0-50 VIAL (ML) INTRAVENOUS
Status: DISCONTINUED | OUTPATIENT
Start: 2019-03-18 | End: 2019-03-18 | Stop reason: HOSPADM

## 2019-03-18 RX ORDER — INSULIN LISPRO 100 [IU]/ML
INJECTION, SOLUTION INTRAVENOUS; SUBCUTANEOUS
Status: DISCONTINUED | OUTPATIENT
Start: 2019-03-18 | End: 2019-03-18 | Stop reason: HOSPADM

## 2019-03-18 RX ORDER — LEVETIRACETAM 10 MG/ML
1000 INJECTION INTRAVASCULAR
Status: COMPLETED | OUTPATIENT
Start: 2019-03-18 | End: 2019-03-18

## 2019-03-18 RX ADMIN — SODIUM CHLORIDE 1000 ML: 900 INJECTION, SOLUTION INTRAVENOUS at 05:33

## 2019-03-18 RX ADMIN — ETOMIDATE 10 MG: 2 INJECTION, SOLUTION INTRAVENOUS at 06:06

## 2019-03-18 RX ADMIN — HUMAN INSULIN 10 UNITS: 100 INJECTION, SOLUTION SUBCUTANEOUS at 07:10

## 2019-03-18 RX ADMIN — SODIUM CHLORIDE 1000 ML: 900 INJECTION, SOLUTION INTRAVENOUS at 06:50

## 2019-03-18 RX ADMIN — LEVETIRACETAM 1000 MG: 10 INJECTION INTRAVENOUS at 05:33

## 2019-03-18 RX ADMIN — PROPOFOL 10 MCG/KG/MIN: 10 INJECTION, EMULSION INTRAVENOUS at 06:17

## 2019-03-18 RX ADMIN — SODIUM CHLORIDE 5.3 UNITS/HR: 900 INJECTION, SOLUTION INTRAVENOUS at 07:19

## 2019-03-18 RX ADMIN — ROCURONIUM BROMIDE 50 MG: 10 INJECTION INTRAVENOUS at 06:07

## 2019-03-18 NOTE — ED PROVIDER NOTES
EMERGENCY DEPARTMENT HISTORY AND PHYSICAL EXAM           Date: 3/18/2019  Patient Name: David Hoffman    History of Presenting Illness     Chief Complaint   Patient presents with    Altered mental status     pt presents from home via EMS s/p grand mal seizures. Per EMS \"Family witnessed pt having 2 grand mal seizures approx. 3 mins in duration. Pt lost conciousness after both. \"  Pt post icctal and altered at this time    Seizure       History Provided By: EMS    HPI: David Hoffman is a 34 y.o. female, pmhx seizure disorder, recent overdose, who presents EMS to the ED c/o witnessed seizure at home. Patient's family called EMS after patient had 2-3-minute seizure. Patient with history of seizure disorder on Keppra. Also of note patient was recently admitted to [facility] for reported overdose. Unknown substance. Patient noted to have depression on antidepressants. Patient will open her eyes but does not respond to verbal stimuli only responds to painful stimuli. History of present illness Limited due to patient's acuity of condition. PCP: None      Social Hx: Negative tobacco occl EtOH , denied in past Illicit Drugs    There are no other complaints, changes, or physical findings at this time.      Current Facility-Administered Medications   Medication Dose Route Frequency Provider Last Rate Last Dose    LORazepam (ATIVAN) injection 1 mg  1 mg IntraVENous ONCE PRN Waldemar Perez MD        propofol (DIPRIVAN) infusion  0-50 mcg/kg/min IntraVENous TITRATE Waldemar Perez MD 6.3 mL/hr at 03/18/19 0649 20 mcg/kg/min at 03/18/19 0649    sodium chloride 0.9 % bolus infusion 1,000 mL  1,000 mL IntraVENous NOW Waldemar Perez MD 1,000 mL/hr at 03/18/19 0650 1,000 mL at 03/18/19 0650    insulin lispro (HUMALOG) injection   SubCUTAneous TIDAC Waldemar Perez MD        glucose chewable tablet 16 g  4 Tab Oral PRN Waldemar Perez MD        dextrose (D50W) injection syrg 12.5-25 g  25-50 mL IntraVENous PRN Isela Hernandez, Madison Glass MD        glucagon [facility]) injection 1 mg  1 mg IntraMUSCular PRN Roverto Hartmann MD        insulin regular (NOVOLIN R, HUMULIN R) 100 Units in 0.9% sodium chloride 100 mL infusion  0-50 Units/hr IntraVENous TITRATE Roverto Hartmann MD        0.9% sodium chloride infusion 250 mL  250 mL IntraVENous PRN Roverto Hartmann MD         Current Outpatient Medications   Medication Sig Dispense Refill    ondansetron hcl (ZOFRAN, AS HYDROCHLORIDE,) 4 mg tablet Take 1 Tab by mouth every eight (8) hours as needed for Nausea. 20 Tab 0    HYDROcodone-acetaminophen (NORCO) 7.5-325 mg per tablet Take 1 Tab by mouth every six (6) hours as needed for Pain. Max Daily Amount: 4 Tabs. 20 Tab 0    HYDROcodone-acetaminophen (NORCO) 5-325 mg per tablet Take 1 Tab by mouth every four (4) hours as needed for Pain. Max Daily Amount: 6 Tabs. 20 Tab 0    ALPRAZolam (XANAX) 1 mg tablet Take  by mouth.  Biotin 2,500 mcg cap Take  by mouth.  docusate sodium (COLACE) 100 mg capsule Take 100 mg by mouth two (2) times a day.  gabapentin (NEURONTIN) 300 mg capsule Take 300 mg by mouth three (3) times daily.  medroxyPROGESTERone (DEPO-PROVERA) 150 mg/mL injection 150 mg by IntraMUSCular route once.  omega-3 fatty acids-vitamin e 1,000 mg cap Take 1 Cap by mouth.  vitamin E (AQUA GEMS) 400 unit capsule Take  by mouth daily.  methadone (DOLOPHINE) 10 mg tablet Take  by mouth every four (4) hours as needed for Pain.  ibuprofen (MOTRIN) 800 mg tablet Take 800 mg by mouth.  cyclobenzaprine (FLEXERIL) 10 mg tablet Take  by mouth three (3) times daily as needed for Muscle Spasm(s).  albuterol (PROVENTIL VENTOLIN) 2.5 mg /3 mL (0.083 %) nebulizer solution 3 mL by Nebulization route every four (4) hours as needed. 1 Package 0    calcium-vitamin D (OSCAL 250) 250-125 mg-unit tablet Take 1 Tab by mouth two (2) times a day. 60 Tab 0    citalopram (CELEXA) 20 mg tablet Take 1 Tab by mouth daily. 30 Tab 0    fluticasone (FLONASE) 50 mcg/actuation nasal spray 2 sprays each nostril daily 1 Bottle 5    folic acid (FOLVITE) 1 mg tablet Take 1 Tab by mouth daily. 30 Tab 0    hydroxychloroquine (PLAQUENIL) 200 mg tablet Take 2 Tabs by mouth daily. 30 Tab 0    levETIRAcetam (KEPPRA) 500 mg tablet Take 1 Tab by mouth two (2) times a day. 60 Tab 0    mycophenolate (CELLCEPT) 500 mg tablet Take 2 Tabs by mouth daily. 30 Tab 0    omeprazole (PRILOSEC) 40 mg capsule Take 1 Cap by mouth daily. Indications: GASTROESOPHAGEAL REFLUX 30 Cap 0    oxyCODONE CR (OXYCONTIN) 60 mg CR tablet Take 2 Tabs by mouth every twelve (12) hours. Max Daily Amount: 240 mg. 120 Tab 0    predniSONE (DELTASONE) 5 mg tablet Take 3 Tabs by mouth daily. 90 Tab 0    oxyCODONE IR (OXY-IR) 30 mg immediate release tablet Take 1 Tab by mouth every three (3) hours. 1-3 tab 30 Tab 0    HYDROmorphone (DILAUDID) 8 mg tablet Take 1 Tab by mouth every two (2) hours as needed. Indications: PAIN 30 Tab 0    butalbital-acetaminophen-caffeine (FIORICET) -40 mg per tablet Take 2 Tabs by mouth every four (4) hours as needed.  amLODIPine (NORVASC) 10 mg tablet Take 10 mg by mouth daily.  citalopram (CELEXA) 10 mg tablet Take 10 mg by mouth daily.  clonazePAM (KLONOPIN) 0.5 mg tablet Take 0.5 mg by mouth two (2) times a day.  docusate sodium (COLACE) 100 mg capsule Take 100 mg by mouth two (2) times a day.  folic acid (FOLVITE) 1 mg tablet Take  by mouth daily.  hydrALAZINE (APRESOLINE) 25 mg tablet Take 75 mg by mouth three (3) times daily.  hydroxychloroquine (PLAQUINIL) 200 mg tablet Take 400 mg by mouth daily.  levETIRAcetam (KEPPRA) 500 mg tablet Take 500 mg by mouth two (2) times a day.  losartan (COZAAR) 100 mg tablet Take 100 mg by mouth daily.  mycophenolate (CELLCEPT) 500 mg tablet Take 1,000 mg by mouth daily.         omeprazole (PRILOSEC) 40 mg capsule Take 40 mg by mouth daily.  predniSONE (DELTASONE) 5 mg tablet Take 15 mg by mouth daily.  zolpidem (AMBIEN) 5 mg tablet Take 5 mg by mouth nightly as needed. Indications: SLEEP-ONSET INSOMNIA, agitation      albuterol (PROVENTIL VENTOLIN) 2.5 mg /3 mL (0.083 %) nebulizer solution 5 mg by Nebulization route two (2) times a day. Indications: wheezing         Past History     Past Medical History:  Past Medical History:   Diagnosis Date    Anemia NEC sickel cell    Asthma     Autoimmune disease ([facility] lupus    Epilepsy ([facility]     Hypertension     Lupus     Other ill-defined conditions(799.89)     avascular necrosis    Other ill-defined conditions(799.89)     Sickle Cell    Psychiatric disorder     anxiety, depression, and bipolar    Seizures ([facility]     Sickle cell disease ([facility] [facility]        Past Surgical History:  Past Surgical History:   Procedure Laterality Date    HX CHOLECYSTECTOMY      HX HEENT      T&A    HX HIP REPLACEMENT  April of 2013    HX ORTHOPAEDIC      right hip replacement     HX OTHER SURGICAL  splenectomy    tonsillectomy    HX OTHER SURGICAL      spleen removed    HX SPLENECTOMY  2000       Family History:  Family History   Problem Relation Age of Onset    Hypertension Mother     Asthma Mother     Asthma Brother        Social History:  Social History     Tobacco Use    Smoking status: Never Smoker    Smokeless tobacco: Never Used   Substance Use Topics    Alcohol use: Yes     Comment: Occassionally    Drug use: No       Allergies: Allergies   Allergen Reactions    Hydroxyurea Seizures    Morphine Seizures    Morphine Shortness of Breath and Itching     C/o dizziness         Review of Systems   Review of Systems   Unable to perform ROS: Acuity of condition       Physical Exam   Physical Exam   Constitutional: She appears well-nourished. She appears lethargic. No distress. Frail   HENT:   Head: Normocephalic and atraumatic.    Nose: Nose normal.   Eyes: EOM are normal. Pupils are equal, round, and reactive to light. Right conjunctiva is injected. Left conjunctiva is injected. No scleral icterus. Somewhat R upward gaze but not fixed   Neck: Neck supple. No tracheal deviation present. Cardiovascular: Regular rhythm, normal heart sounds and intact distal pulses. Tachycardia present. Exam reveals no friction rub. No murmur heard. Pulmonary/Chest: Breath sounds normal. No stridor. Tachypnea noted. She is in respiratory distress. She has no wheezes. She has no rales. Abdominal: Soft. Bowel sounds are normal. She exhibits no distension. There is no tenderness. There is no rebound. Musculoskeletal: Normal range of motion. She exhibits no tenderness. Neurological: She appears lethargic. No cranial nerve deficit. GCS eye subscore is 4. GCS verbal subscore is 1. GCS motor subscore is 4. Skin: Skin is warm and dry. No rash noted. She is not diaphoretic. Nursing note and vitals reviewed. Diagnostic Study Results     Labs -     Recent Results (from the past 12 hour(s))   METABOLIC PANEL, COMPREHENSIVE    Collection Time: 03/18/19  4:41 AM   Result Value Ref Range    Sodium 139 136 - 145 mmol/L    Potassium 5.1 3.5 - 5.1 mmol/L    Chloride 107 97 - 108 mmol/L    CO2 5 (LL) 21 - 32 mmol/L    Anion gap 27 (H) 5 - 15 mmol/L    Glucose 421 (H) 65 - 100 mg/dL    BUN 9 6 - 20 MG/DL    Creatinine 1.61 (H) 0.55 - 1.02 MG/DL    BUN/Creatinine ratio 6 (L) 12 - 20      GFR est AA 47 (L) >60 ml/min/1.73m2    GFR est non-AA 39 (L) >60 ml/min/1.73m2    Calcium 9.0 8.5 - 10.1 MG/DL    Bilirubin, total 0.4 0.2 - 1.0 MG/DL    ALT (SGPT) 35 12 - 78 U/L    AST (SGOT) 39 (H) 15 - 37 U/L    Alk.  phosphatase 129 (H) 45 - 117 U/L    Protein, total 6.5 6.4 - 8.2 g/dL    Albumin 3.1 (L) 3.5 - 5.0 g/dL    Globulin 3.4 2.0 - 4.0 g/dL    A-G Ratio 0.9 (L) 1.1 - 2.2     CBC WITH AUTOMATED DIFF    Collection Time: 03/18/19  4:41 AM   Result Value Ref Range    WBC 33.3 (H) 3.6 - 11.0 K/uL    RBC 2.36 (L) 3.80 - 5.20 M/uL    HGB 6.5 (L) 11.5 - 16.0 g/dL    HCT 22.6 (L) 35.0 - 47.0 %    MCV 95.8 80.0 - 99.0 FL    MCH 27.5 26.0 - 34.0 PG    MCHC 28.8 (L) 30.0 - 36.5 g/dL    RDW 18.7 (H) 11.5 - 14.5 %    PLATELET 886 601 - 088 K/uL    MPV 10.3 8.9 - 12.9 FL    NRBC 0.4 (H) 0  WBC    ABSOLUTE NRBC 0.13 (H) 0.00 - 0.01 K/uL    NEUTROPHILS 39 32 - 75 %    BAND NEUTROPHILS 2 %    LYMPHOCYTES 53 (H) 12 - 49 %    MONOCYTES 3 (L) 5 - 13 %    EOSINOPHILS 0 0 - 7 %    BASOPHILS 0 0 - 1 %    METAMYELOCYTES 1 %    MYELOCYTES 2 %    IMMATURE GRANULOCYTES 0 0.0 - 0.5 %    ABS. NEUTROPHILS 13.7 (H) 1.8 - 8.0 K/UL    ABS. LYMPHOCYTES 17.7 (H) 0.8 - 3.5 K/UL    ABS. MONOCYTES 1.0 0.0 - 1.0 K/UL    ABS. EOSINOPHILS 0.0 0.0 - 0.4 K/UL    ABS. BASOPHILS 0.0 0.0 - 0.1 K/UL    ABS. IMM.  GRANS. 0.0 0.00 - 0.04 K/UL    DF MANUAL      RBC COMMENTS ANISOCYTOSIS  1+        RBC COMMENTS HYPOCHROMIA  1+        RBC COMMENTS SCHISTOCYTES  PRESENT        RBC COMMENTS SICKLE CELLS  2+        WBC COMMENTS SMEAR FOR PATHOLOGIST REVIEW     ETHYL ALCOHOL    Collection Time: 03/18/19  4:41 AM   Result Value Ref Range    ALCOHOL(ETHYL),SERUM <10 <10 MG/DL   ACETAMINOPHEN    Collection Time: 03/18/19  4:41 AM   Result Value Ref Range    Acetaminophen level 5 (L) 10 - 30 ug/mL   SALICYLATE    Collection Time: 03/18/19  4:41 AM   Result Value Ref Range    Salicylate level 6.5 2.8 - 20.0 MG/DL   LACTIC ACID    Collection Time: 03/18/19  4:41 AM   Result Value Ref Range    Lactic acid 22.1 (HH) 0.4 - 2.0 MMOL/L   EKG, 12 LEAD, INITIAL    Collection Time: 03/18/19  5:19 AM   Result Value Ref Range    Ventricular Rate 125 BPM    Atrial Rate 125 BPM    P-R Interval 184 ms    QRS Duration 78 ms    Q-T Interval 296 ms    QTC Calculation (Bezet) 427 ms    Calculated P Axis 67 degrees    Calculated R Axis 42 degrees    Calculated T Axis 85 degrees    Diagnosis       Sinus tachycardia  Possible Left atrial enlargement  Nonspecific T wave abnormality  No previous ECGs available     POC G3 - PUL    Collection Time: 03/18/19  5:36 AM   Result Value Ref Range    pH (POC) 6.812 (LL) 7.35 - 7.45      pCO2 (POC) 53.8 (H) 35.0 - 45.0 MMHG    pO2 (POC) 160 (H) 80 - 100 MMHG    HCO3 (POC) 8.6 (L) 22 - 26 MMOL/L    sO2 (POC) 97 92 - 97 %    Base deficit (POC) 26 mmol/L    Site RIGHT BRACHIAL      Device: NASAL CANNULA      Flow rate (POC) 2 L/M    Allens test (POC) YES      Specimen type (POC) ARTERIAL      Total resp. rate 24     URINALYSIS W/ REFLEX CULTURE    Collection Time: 03/18/19  6:32 AM   Result Value Ref Range    Color YELLOW/STRAW      Appearance CLEAR CLEAR      Specific gravity 1.010 1.003 - 1.030      pH (UA) 6.5 5.0 - 8.0      Protein 30 (A) NEG mg/dL    Glucose >1,000 (A) NEG mg/dL    Ketone NEGATIVE  NEG mg/dL    Bilirubin NEGATIVE  NEG      Blood LARGE (A) NEG      Urobilinogen 0.2 0.2 - 1.0 EU/dL    Nitrites NEGATIVE  NEG      Leukocyte Esterase NEGATIVE  NEG      WBC 0-4 0 - 4 /hpf    RBC 0-5 0 - 5 /hpf    Epithelial cells FEW FEW /lpf    Bacteria 1+ (A) NEG /hpf    UA:UC IF INDICATED URINE CULTURE ORDERED (A) CNI     POC G3 - PUL    Collection Time: 03/18/19  7:05 AM   Result Value Ref Range    FIO2 (POC) 50 %    pH (POC) 7.201 (LL) 7.35 - 7.45      pCO2 (POC) 28.0 (L) 35.0 - 45.0 MMHG    pO2 (POC) 276 (H) 80 - 100 MMHG    HCO3 (POC) 11.0 (L) 22 - 26 MMOL/L    sO2 (POC) 100 (H) 92 - 97 %    Base deficit (POC) 17 mmol/L    Site LEFT BRACHIAL      Device: VENT      Mode ASSIST CONTROL      Tidal volume 0 ml    Set Rate 20 bpm    PEEP/CPAP (POC) 6 cmH2O    Allens test (POC) YES      Specimen type (POC) ARTERIAL      Total resp. rate 20         Radiologic Studies -   XR CHEST PORT   Final Result   IMPRESSION:    1. Appropriate position of endotracheal tube. No pneumothorax. Clear lungs. 2. Avascular necrosis of the left humeral head, and possibly the right humeral   head. CT HEAD WO CONT   Final Result   IMPRESSION:   1.  Thin left cerebral convexity mixed density subdural hematoma measuring 8 mm   in maximal thickness. This likely represents either an evolving acute subdural   hematoma or an acute on chronic subdural hematoma with hyperdense components   noted superiorly. There is no significant mass effect or midline shift. 2. Asymmetric enlargement of the right temporal horn. This may be an anatomic   variant, although in the context of seizures, this could represent a finding of   mesial temporal sclerosis. Recommend MRI for further evaluation. The findings were called to Joselin Peñaloza on 3/18/2019 at 5:21 AM by Dr. Adrianne Renee. [id number]        CT Results  (Last 48 hours)               03/18/19 0502  CT HEAD WO CONT Final result    Impression:  IMPRESSION:   1. Thin left cerebral convexity mixed density subdural hematoma measuring 8 mm   in maximal thickness. This likely represents either an evolving acute subdural   hematoma or an acute on chronic subdural hematoma with hyperdense components   noted superiorly. There is no significant mass effect or midline shift. 2. Asymmetric enlargement of the right temporal horn. This may be an anatomic   variant, although in the context of seizures, this could represent a finding of   mesial temporal sclerosis. Recommend MRI for further evaluation. The findings were called to Joselin Peñaloza on 3/18/2019 at 5:21 AM by Dr. Adrianne Renee. [id number]       Narrative:  EXAM:  CT HEAD WO CONT       INDICATION:   Confusion/delirium, altered LOC, unexplained; ams, seizure       COMPARISON: None. TECHNIQUE: Unenhanced CT of the head was performed using 5 mm images. Brain and   bone windows were generated. CT dose reduction was achieved through use of a   standardized protocol tailored for this examination and automatic exposure   control for dose modulation.         FINDINGS:   Thin left cerebral convexity subdural hematoma measuring 8 mm in maximal   diameter, which is predominantly isodense to hypodense, with some hyperdense   components noted at the superior aspect (series 601B image 23). No significant   mass effect. The ventricles are normal in size and position. There is asymmetric   enlargement of the right temporal horn. Basilar cisterns are patent. No midline   shift. No evidence of acute infarct. The paranasal sinuses, mastoid air cells, and middle ears are clear. The orbital   contents are within normal limits. There are no significant osseous or   extracranial soft tissue lesions. CXR Results  (Last 48 hours)               03/18/19 0650  XR CHEST PORT Final result    Impression:  IMPRESSION:    1. Appropriate position of endotracheal tube. No pneumothorax. Clear lungs. 2. Avascular necrosis of the left humeral head, and possibly the right humeral   head. Narrative:  EXAM:  XR CHEST PORT       INDICATION:   intubation placement       COMPARISON: Chest radiograph 1/20/2019. FINDINGS: AP radiograph of the chest was obtained. Interval placement of endotracheal tube terminating in the midthoracic trachea. No pneumothorax. Gastric decompression tube is seen terminating in the first   portion of the duodenum. Lungs are clear. No pleural effusion. Heart size is   normal. There is serpiginous sclerosis noted within the left humeral head, and   possibly within the right humeral head. Cholecystectomy clips. Medical Decision Making   I am the first provider for this patient. I reviewed the vital signs, available nursing notes, past medical history, past surgical history, family history and social history. Vital Signs-Reviewed the patient's vital signs.   Patient Vitals for the past 12 hrs:   Temp Pulse Resp BP SpO2   03/18/19 0710 95.9 °F (35.5 °C) (!) 139 16 (!) 128/101 100 %   03/18/19 0700  (!) 142 20 (!) 124/98    03/18/19 0645  (!) 144 19 (!) 129/103    03/18/19 0630  (!) 142 (!) 7 (!) 131/98    03/18/19 0615  (!) 136 20 124/85 100 %   03/18/19 0612  (!) 140 20  100 %   03/18/19 0611  (!) 138  114/72 100 %   03/18/19 0604  (!) 130 16 111/80 100 %   03/18/19 0600  (!) 127 22 119/77 99 %   03/18/19 0550  (!) 127 23 119/75 99 %   03/18/19 0545  (!) 126 23 120/76 99 %   03/18/19 0530  (!) 125 20 119/73 98 %   03/18/19 0445  (!) 120 19 119/79 97 %   03/18/19 0430  (!) 127 24 126/86    03/18/19 0420 99.2 °F (37.3 °C) (!) 125 25 117/72 94 %       Pulse Oximetry Analysis - 94% on 2L nc    Cardiac Monitor:   Rate: 125 bpm  Rhythm: sinus tach     Records Reviewed: Nursing Notes, Old Medical Records, Previous electrocardiograms, Ambulance Run Sheet, Previous Radiology Studies and Previous Laboratory Studies    Provider Notes (Medical Decision Making):     DDX:  Epilepsy, overdose, intracranial bleed, intracranial mass, little light abnormality, arrhythmia    Plan:  EKG, labs, UA, pregnancy test, head CT, IV fluids, review [facility] records from recent evaluation    Impression:  Subdural hematoma, seizure, acidosis, acute respiratory failure    ED Course:   Initial assessment performed. The patients presenting problems have been discussed, and they are in agreement with the care plan formulated and outlined with them. I have encouraged them to ask questions as they arise throughout their visit. I reviewed our electronic medical record system for any past medical records that were available that may contribute to the patients current condition, the nursing notes and and vital signs from today's visit    Nursing notes will be reviewed as they become available in realtime while the pt has been in the ED. Ernesto Riley MD      EKG interpretation 6135: sinus tach, nl Axis, rate 125; , QRS 78, QTc 427; no acute ischemia; Ernesto Riley MD    I personally reviewed pt's imaging. Official read by radiology noted above.   Ernesto Riley MD         PROGRESS NOTE:  Procedure Note - Orotracheal Intubation:   6:09 AM  Performed by: Ernesto Riley MD   Indication for procedure: respiratory failure and potential airway compromise  RSI performed. The patient was sedated with 10 of propofol and orotracheally intubated with a 7.0 cuffed Barbadian endotracheal tube using a mac 4 glidescope blade with direct visualization. Tube was advanced to 22 cm at the teeth. ETT location confirmed by bilateral, symmetric breath sounds, good end-tidal CO2 detector color change , no breath sounds over stomach, bulb aspirator expands promptly and chest x-ray visualization. Number of attempts: 1  Complications: none  RSI was used. The procedure took 1-15 minutes, and pt tolerated well. Love Bull MD    CONSULT NOTE:   6:15 AM  Love Bull MD spoke with Dr. Willy Calles,   Specialty: Neurosurgery  Consulted Dr. Willy Calles due to noted subdural on ct. Discussed pt's HPI and available diagnostic results thus far. Expressed concerns for needed admission. Consultant recommends transfer   To [facility] where she was recently discharged for continuity of care as the pt was recently evaluated and head negative head ct's there that she can not access for comparison. Love Bull MD    PROGRESS NOTE  7347  Pt's vent set for rate of 20 in effort to decrease acidosis. Pt noted to be in DKA as well, will start insulin infusion and drip with continued IVF hydration. Pt   Continues be be tachy and hypertensive after intubation, will have nursing increase her propofol   Aid in comfort and sedation. Pt also anemia with hgb of 6.5, known sickler, but with current critical status, will plan for transfusion. Love Bull MD      CONSULT NOTE:   6:30 AM  Love Bull MD spoke with Dr. Giselle Corona,   Specialty: [facility] ED  Consulted Dr. Giselle Corona due to subdural hematoma/dka/anemia/seizure/possible overdose and recent discharge from their facility and the recommendation of Dr. Willy Calles, neurosurgery. Discussed pt's HPI and available diagnostic results thus far.  Expressed my concerns for needed transfer, Dr. Giselle Corona accepts pt to [facility] ED. Melani Vazquez MD    PROGRESS NOTE  8349  Pt with improvement in BP after increase in propofol but remains tachy, will continue with IVF, pt received 1g Keppra IV. No other seizure movements noted, pt not longer with deviated gaze. Low suspicion for continued seizure activity at this time. Melani Vazquez MD        Procedure Note - Central Line Placement:   6:47 AM  Performed by: Melani Vazquez MD    Immediately prior to the procedure, the patient was reevaluated and found suitable for the planned procedure and any planned medications. Immediately prior to the procedure a time out was called to verify the correct patient, procedure, equipment, staff, and marking as appropriate. Area was cleansed with Chlorprep. Prepped and draped in sterile fashion. Landmarks identified. 18 gauge needle with triple lumen catheter was inserted into pt's Right, Femoral Vein without ultrasound guidance. Line sutured in place; sterile dressing applied. Position: Trendelenburg  Number of attempts: 1  Estimated blood loss: 1 mL  The procedure took 1-15 minutes, and pt tolerated well. PROGRESS NOTE  7:18 AM  Pt's abg improved, ph 7.2 now. Will drop vent rate down to 16. Pharmacy to send inulin gtt now. Blood products not yet available and may not arrive prior to pt's  Transfer. Pt's cxr read does not comment on NGT placement although I visualized myself and noted to be in stomach. UA and UDS results in lab, not resulted yet.   Melani Vazquez MD        Critical Care Time:     CRITICAL CARE NOTE:  5:10 AM  IMPENDING DETERIORATION -Airway, Respiratory, Cardiovascular, CNS and Metabolic  ASSOCIATED RISK FACTORS - Dysrhythmia, Metabolic changes, Dehydration, Vascular Compromise and CNS Decompensation  MANAGEMENT- Bedside Assessment, Supervision of Care and Transfer  INTERPRETATION -  Xrays, CT Scan, Blood Gases, ECG and Blood Pressure  INTERVENTIONS - hemodynamic mngmt, vent mngmt, gastric tube, vascular control, Neurologic interventions  and Metobolic interventions  CASE REVIEW - Medical Sub-Specialist and Nursing  TREATMENT RESPONSE -Improved and Stable  PERFORMED BY - Self  NOTES   :  I have spent 75 minutes of critical care time involved in lab review, consultations with specialist, family decision- making, bedside attention and documentation excluding time spent on any separately billed procedures. During this entire length of time I was immediately available to the patient . Melani Vazquez MD      Diagnosis     Clinical Impression:   1. Subdural hemorrhage ([facility] [address]    2. Acute respiratory failure with hypercapnia (HCC)    3. Seizure ([address]    4. Diabetic ketoacidosis with coma associated with other specified diabetes mellitus ([facility]    5. Hb-SS disease with crisis ([facility] [address]    6. Anemia, unspecified type      Disposition:    PLAN FOR TRANSFER:  6:30 AM  The patient is being transferred to [facility] ED for further care. The results of their tests and reasons for their transfer have been discussed with the patient and/or available family. The patient/family has conveyed agreement and understanding for the need to be admitted and for their admission diagnosis. Consultation has been made Dr. Dion Borden who agrees to accept the transfer. Melani Vazquez MD    Last Look:  7:01 AM    Prior to transfer to accepting facility, pt has been re-evaluated by myself and noted to be safe for transfer at this time. EMS informed to immediately notify accepting facility should the pt have any changes in their status while enroute. Melani Vazquez MD        This note will not be viewable in [address]

## 2019-03-19 LAB
BACTERIA SPEC CULT: NORMAL
CC UR VC: NORMAL
SERVICE CMNT-IMP: NORMAL